# Patient Record
Sex: FEMALE | Race: WHITE | NOT HISPANIC OR LATINO | Employment: OTHER | URBAN - NONMETROPOLITAN AREA
[De-identification: names, ages, dates, MRNs, and addresses within clinical notes are randomized per-mention and may not be internally consistent; named-entity substitution may affect disease eponyms.]

---

## 2021-04-28 ENCOUNTER — DOCUMENTATION (OUTPATIENT)
Dept: ONCOLOGY | Facility: CLINIC | Age: 51
End: 2021-04-28

## 2021-04-28 ENCOUNTER — CONSULT (OUTPATIENT)
Dept: ONCOLOGY | Facility: CLINIC | Age: 51
End: 2021-04-28

## 2021-04-28 VITALS
HEIGHT: 62 IN | TEMPERATURE: 97.5 F | BODY MASS INDEX: 53.92 KG/M2 | WEIGHT: 293 LBS | DIASTOLIC BLOOD PRESSURE: 84 MMHG | HEART RATE: 101 BPM | OXYGEN SATURATION: 93 % | RESPIRATION RATE: 18 BRPM | SYSTOLIC BLOOD PRESSURE: 136 MMHG

## 2021-04-28 DIAGNOSIS — D75.1 POLYCYTHEMIA: ICD-10-CM

## 2021-04-28 DIAGNOSIS — I82.622: Primary | ICD-10-CM

## 2021-04-28 DIAGNOSIS — G54.0 THORACIC OUTLET SYNDROME: ICD-10-CM

## 2021-04-28 LAB
ALBUMIN SERPL-MCNC: 3.73 G/DL (ref 3.5–5.2)
ALBUMIN/GLOB SERPL: 1 G/DL
ALP SERPL-CCNC: 81 U/L (ref 39–117)
ALT SERPL W P-5'-P-CCNC: 29 U/L (ref 1–33)
ANION GAP SERPL CALCULATED.3IONS-SCNC: 11.1 MMOL/L (ref 5–15)
AST SERPL-CCNC: 14 U/L (ref 1–32)
BASOPHILS # BLD AUTO: 0.07 10*3/MM3 (ref 0–0.2)
BASOPHILS NFR BLD AUTO: 0.6 % (ref 0–1.5)
BILIRUB SERPL-MCNC: 0.3 MG/DL (ref 0–1.2)
BUN SERPL-MCNC: 14 MG/DL (ref 6–20)
BUN/CREAT SERPL: 17.1 (ref 7–25)
CALCIUM SPEC-SCNC: 9.1 MG/DL (ref 8.6–10.5)
CHLORIDE SERPL-SCNC: 105 MMOL/L (ref 98–107)
CO2 SERPL-SCNC: 22.9 MMOL/L (ref 22–29)
CREAT SERPL-MCNC: 0.82 MG/DL (ref 0.57–1)
DEPRECATED RDW RBC AUTO: 48.9 FL (ref 37–54)
EOSINOPHIL # BLD AUTO: 0.17 10*3/MM3 (ref 0–0.4)
EOSINOPHIL NFR BLD AUTO: 1.4 % (ref 0.3–6.2)
ERYTHROCYTE [DISTWIDTH] IN BLOOD BY AUTOMATED COUNT: 16.1 % (ref 12.3–15.4)
FERRITIN SERPL-MCNC: 41.64 NG/ML (ref 13–150)
GFR SERPL CREATININE-BSD FRML MDRD: 73 ML/MIN/1.73
GLOBULIN UR ELPH-MCNC: 3.8 GM/DL
GLUCOSE SERPL-MCNC: 140 MG/DL (ref 65–99)
HCT VFR BLD AUTO: 51 % (ref 34–46.6)
HGB BLD-MCNC: 15.5 G/DL (ref 12–15.9)
IMM GRANULOCYTES # BLD AUTO: 0.03 10*3/MM3 (ref 0–0.05)
IMM GRANULOCYTES NFR BLD AUTO: 0.3 % (ref 0–0.5)
IRON 24H UR-MRATE: 35 MCG/DL (ref 37–145)
IRON SATN MFR SERPL: 8 % (ref 20–50)
LYMPHOCYTES # BLD AUTO: 1.89 10*3/MM3 (ref 0.7–3.1)
LYMPHOCYTES NFR BLD AUTO: 15.8 % (ref 19.6–45.3)
MCH RBC QN AUTO: 25.7 PG (ref 26.6–33)
MCHC RBC AUTO-ENTMCNC: 30.4 G/DL (ref 31.5–35.7)
MCV RBC AUTO: 84.4 FL (ref 79–97)
MONOCYTES # BLD AUTO: 0.63 10*3/MM3 (ref 0.1–0.9)
MONOCYTES NFR BLD AUTO: 5.3 % (ref 5–12)
NEUTROPHILS NFR BLD AUTO: 76.6 % (ref 42.7–76)
NEUTROPHILS NFR BLD AUTO: 9.19 10*3/MM3 (ref 1.7–7)
NRBC BLD AUTO-RTO: 0 /100 WBC (ref 0–0.2)
PLATELET # BLD AUTO: 350 10*3/MM3 (ref 140–450)
PMV BLD AUTO: 11.5 FL (ref 6–12)
POTASSIUM SERPL-SCNC: 4.6 MMOL/L (ref 3.5–5.2)
PROT SERPL-MCNC: 7.5 G/DL (ref 6–8.5)
RBC # BLD AUTO: 6.04 10*6/MM3 (ref 3.77–5.28)
SODIUM SERPL-SCNC: 139 MMOL/L (ref 136–145)
TIBC SERPL-MCNC: 434 MCG/DL (ref 298–536)
TRANSFERRIN SERPL-MCNC: 291 MG/DL (ref 200–360)
WBC # BLD AUTO: 11.98 10*3/MM3 (ref 3.4–10.8)

## 2021-04-28 PROCEDURE — 80053 COMPREHEN METABOLIC PANEL: CPT | Performed by: INTERNAL MEDICINE

## 2021-04-28 PROCEDURE — 83090 ASSAY OF HOMOCYSTEINE: CPT | Performed by: INTERNAL MEDICINE

## 2021-04-28 PROCEDURE — 85025 COMPLETE CBC W/AUTO DIFF WBC: CPT | Performed by: INTERNAL MEDICINE

## 2021-04-28 PROCEDURE — 81241 F5 GENE: CPT | Performed by: INTERNAL MEDICINE

## 2021-04-28 PROCEDURE — 82728 ASSAY OF FERRITIN: CPT | Performed by: INTERNAL MEDICINE

## 2021-04-28 PROCEDURE — 36415 COLL VENOUS BLD VENIPUNCTURE: CPT | Performed by: INTERNAL MEDICINE

## 2021-04-28 PROCEDURE — 81240 F2 GENE: CPT | Performed by: INTERNAL MEDICINE

## 2021-04-28 PROCEDURE — 86147 CARDIOLIPIN ANTIBODY EA IG: CPT | Performed by: INTERNAL MEDICINE

## 2021-04-28 PROCEDURE — 86146 BETA-2 GLYCOPROTEIN ANTIBODY: CPT | Performed by: INTERNAL MEDICINE

## 2021-04-28 PROCEDURE — 81291 MTHFR GENE: CPT | Performed by: INTERNAL MEDICINE

## 2021-04-28 PROCEDURE — 83540 ASSAY OF IRON: CPT | Performed by: INTERNAL MEDICINE

## 2021-04-28 PROCEDURE — 84466 ASSAY OF TRANSFERRIN: CPT | Performed by: INTERNAL MEDICINE

## 2021-04-28 PROCEDURE — 99204 OFFICE O/P NEW MOD 45 MIN: CPT | Performed by: INTERNAL MEDICINE

## 2021-04-28 RX ORDER — MECLIZINE HCL 25MG 25 MG/1
25 TABLET, CHEWABLE ORAL 3 TIMES DAILY PRN
COMMUNITY

## 2021-04-28 RX ORDER — CEFDINIR 300 MG/1
300 CAPSULE ORAL NIGHTLY
COMMUNITY

## 2021-04-28 RX ORDER — METOPROLOL SUCCINATE 25 MG/1
25 TABLET, EXTENDED RELEASE ORAL DAILY
COMMUNITY

## 2021-04-28 RX ORDER — EMPAGLIFLOZIN 25 MG/1
25 TABLET, FILM COATED ORAL EVERY MORNING
COMMUNITY

## 2021-04-28 RX ORDER — OMEPRAZOLE 40 MG/1
40 CAPSULE, DELAYED RELEASE ORAL DAILY
COMMUNITY

## 2021-04-28 RX ORDER — LEVOTHYROXINE SODIUM 0.12 MG/1
125 TABLET ORAL DAILY
COMMUNITY

## 2021-04-28 NOTE — PROGRESS NOTES
Rosie Villegas  [id number]  1970 4/28/2021      Referring Provider:   Mahin Butt MD    Reason for Consultation:   Left upper extremity basalic deep venous thrombosis (DVT)    Chief Complaint:  Deep venous thrombosis      History of Present Illness   Rosie Villegas is a very pleasant 50 y.o.  female who presents in new consultation at the request of Dr. Mahin Butt for further management and evaluation of left upper extremity basalic deep venous thrombosis (DVT).    She reports that several weeks ago she moved a heavy couch and thereafter began experiencing left arm pain along with bluish discoloration in her fingertips which concerned to seek further evaluation from her primary physician who obtained a upper extremity duplex and was found to have a basilic vein thrombosis. She was started on Eliquis which she is tolerating well and her symptoms have improved. She denies of any personal or family history significant for thrombosis or miscarriages. No recent oral contraceptive or hormone replacement treatment. She has had no lines or devices in that arm or chest. She does reports that several years ago she was painting ceilings and thereafter developed neck pain and since that time experiences intermittent left arm cramping pain, numbness, and weakness with excessive use. Previous to starting Eliquis she was on a daily ASA which she discontinued once she started anticoagulation.         The following portions of the patient's history were reviewed and updated as appropriate: allergies, current medications, past family history, past medical history, past social history, past surgical history and problem list.      No Known Allergies      Past Medical History:   Diagnosis Date   • Diabetes mellitus (CMS/HCC)    • Disease of thyroid gland    • Hypertension    GERD      Past Surgical History:   Procedure Laterality Date   • CHOLECYSTECTOMY     • TUBAL ABDOMINAL LIGATION           Social History        Socioeconomic History   • Marital status:      Spouse name: Not on file   • Number of children: Not on file   • Years of education: Not on file   • Highest education level: Not on file   Tobacco Use   • Smoking status: Never Smoker   • Smokeless tobacco: Never Used   Vaping Use   • Vaping Use: Never used   Substance and Sexual Activity   • Alcohol use: Never   • Drug use: Never   • Sexual activity: Defer   Has two children who are healthy. Previous smoker has stopped for now seven to ten years. No alcohol or illicit drug use. Was previously working as a CNA.       Family History   Problem Relation Age of Onset   • Cancer Prostate  Father Diagnosed in his 60s.              Current Outpatient Medications:   •  apixaban (ELIQUIS) 5 MG tablet tablet, Take 5 mg by mouth 2 (Two) Times a Day., Disp: , Rfl:   •  cefdinir (OMNICEF) 300 MG capsule, Take 300 mg by mouth Every Night., Disp: , Rfl:   •  Empagliflozin (Jardiance) 25 MG tablet, Take 25 mg by mouth Every Morning., Disp: , Rfl:   •  Insulin Glargine (TOUJEO MAX SOLOSTAR SC), Inject 40 mg under the skin into the appropriate area as directed., Disp: , Rfl:   •  levothyroxine (SYNTHROID, LEVOTHROID) 125 MCG tablet, Take 125 mcg by mouth Daily., Disp: , Rfl:   •  meclizine 25 MG chewable tablet chewable tablet, Chew 25 mg 3 (Three) Times a Day As Needed., Disp: , Rfl:   •  metoprolol succinate XL (TOPROL-XL) 25 MG 24 hr tablet, Take 25 mg by mouth Daily., Disp: , Rfl:   •  omeprazole (priLOSEC) 40 MG capsule, Take 40 mg by mouth Daily., Disp: , Rfl:   •  SITagliptin-metFORMIN HCl (JANUMET XR PO), Take 100 mg by mouth., Disp: , Rfl:         Review of Systems  Constitutional: No fever, chills, night sweats or weight loss.   HEENT:  No headaches, vision changes or hearing changes, no sinus drainage, sore throat.   Cardiovascular:  No palpitations, chest pain, syncopal episodes or edema.   Pulmonary:  No shortness of breath, hemoptysis, or cough.    Gastrointestinal:  No nausea or vomiting. No constipation or diarrhea. No abdominal pain. No melena or hematochezia.   Genitourinary:  No hematuria, or changes in urination.   Musculoskeletal:  No pain, or joint problems.   Skin: No current rashes or pruritus.   Endocrine:  No hot flashes or chills   Hematologic:  No history of free bleeding, spontaneous bleeding or clotting problems. No lymphadenopathy.    Immunologic:  No allergies or frequent infections.   Neurologic: No numbness, tingling, or weakness.   Psychiatric:  No anxiety or depression.           Physical Exam  Vital Signs: These were reviewed and listed as per patient’s electronic medical chart  Vitals:    04/28/21 1103   BP: 136/84   Pulse: 101   Resp: 18   Temp: 97.5 °F (36.4 °C)   SpO2: 93%     General: Awake, alert and oriented, in no distress  HEENT: Head is atraumatic, normocephalic, extraocular movements full, no scleral icterus  Neck: supple, no jvd, lymphadenopathy or masses  Cardiovascular: regular rate and rhythm without murmurs, rubs or gallops  Pulmonary: non-labored, clear to auscultation bilaterally, no wheezing  Abdomen: soft, non-tender, non-distended, normal active bowel sounds present, no organomegaly  Extremities: No clubbing, cyanosis or edema  Lymph: No cervical, supraclavicular adenopathy  Neurologic: Mental status as above, alert, awake and oriented, grossly non-focal exam  Skin: warm, dry, intact          Pain Score:  Pain Score    04/28/21 1103   PainSc: 0-No pain       PHQ-Score Total:  PHQ-9 Total Score: 0    IMAGING:             Labs / Studies:    Consult on 04/28/2021   Component Date Value   • Glucose 04/28/2021 140*   • BUN 04/28/2021 14    • Creatinine 04/28/2021 0.82    • Sodium 04/28/2021 139    • Potassium 04/28/2021 4.6    • Chloride 04/28/2021 105    • CO2 04/28/2021 22.9    • Calcium 04/28/2021 9.1    • Total Protein 04/28/2021 7.5    • Albumin 04/28/2021 3.73    • ALT (SGPT) 04/28/2021 29    • AST (SGOT)  04/28/2021 14    • Alkaline Phosphatase 04/28/2021 81    • Total Bilirubin 04/28/2021 0.3    • eGFR Non African Amer 04/28/2021 73    • Globulin 04/28/2021 3.8    • A/G Ratio 04/28/2021 1.0    • BUN/Creatinine Ratio 04/28/2021 17.1    • Anion Gap 04/28/2021 11.1    • WBC 04/28/2021 11.98*   • RBC 04/28/2021 6.04*   • Hemoglobin 04/28/2021 15.5    • Hematocrit 04/28/2021 51.0*   • MCV 04/28/2021 84.4    • MCH 04/28/2021 25.7*   • MCHC 04/28/2021 30.4*   • RDW 04/28/2021 16.1*   • RDW-SD 04/28/2021 48.9    • MPV 04/28/2021 11.5    • Platelets 04/28/2021 350    • Neutrophil % 04/28/2021 76.6*   • Lymphocyte % 04/28/2021 15.8*   • Monocyte % 04/28/2021 5.3    • Eosinophil % 04/28/2021 1.4    • Basophil % 04/28/2021 0.6    • Immature Grans % 04/28/2021 0.3    • Neutrophils, Absolute 04/28/2021 9.19*   • Lymphocytes, Absolute 04/28/2021 1.89    • Monocytes, Absolute 04/28/2021 0.63    • Eosinophils, Absolute 04/28/2021 0.17    • Basophils, Absolute 04/28/2021 0.07    • Immature Grans, Absolute 04/28/2021 0.03    • nRBC 04/28/2021 0.0           Assessment/Plan   Rosie Villegas is a very pleasant 50 y.o.  female who presents in new consultation at the request of Dr. Mahin Butt for further management and evaluation of left upper extremity basalic deep venous thrombosis (DVT).    Left upper extremity basalic deep venous thrombosis (DVT)  - Would recommend a minimum of three months of anticoagulation based on guidelines from the [employer].  - Will obtain a hypercoagulable workup given that she has an upper extremity thrombosis without risk factors. Given her symptoms prior to her diagnosis it is possible that she could also have thoracic outlet syndrome causing her to be at risk for thrombosis and therefore will obtain a CT scan to further evaluate.  - She is currently on Eliquis and tolerating well.  - She was also advised to follow with her primary for routine cancer screening exams as  malignancy can also cause thrombosis. She has not yet had a screening mammogram, colonoscopy, or PAP smear. She will further discuss this with her primary provider.     Polycythemia  - She has no known history significant for an erythrocytosis. Will have her return for labs to repeat CBC and obtain an erythropoietin levels, BCR ABL PCR and JAK2 V617F studies. Iron panel and ferritin levels are pending.     ACO / MACK/Other  Quality measures  -  Rosie Villegas  reports that she has never smoked. She has never used smokeless tobacco. She is a previous smoker and quit ten years ago.  -  Rosie Villegas did not receive 2020 flu vaccine. Declines.   -  Rosie Villegas reports a pain score of 0. Given her pain assessment as noted, treatment options were discussed and the following options were decided upon as a follow-up plan to address the patient's pain: continuation of current treatment plan for pain.  -  Current outpatient and discharge medications have been reconciled for the patient.  Reviewed by: Annalise Galvez MD      I will have the patient return in follow up appointment to review test results in one month with labs and imaging prior. She understands that should she have any questions or concerns prior to her appointment she should give us a call at any time and I would be happy to see her sooner. It was a pleasure to see this patient in clinic today, thank you for allowing me to participate in the care of this patient.      Annalise Galvez MD  04/28/2021  14:36 EDT

## 2021-04-28 NOTE — PROGRESS NOTES
Patient comleted her PHQ depression screening.    PHQ-9 Total Score:  0    Patient seen in office visit this date.      SS will follow as needed.

## 2021-04-29 LAB
CARDIOLIPIN IGG SER IA-ACNC: <9 GPL U/ML (ref 0–14)
CARDIOLIPIN IGM SER IA-ACNC: <9 MPL U/ML (ref 0–12)
HCYS SERPL-MCNC: 9.4 UMOL/L (ref 0–15)

## 2021-04-30 LAB
B2 GLYCOPROT1 IGA SER-ACNC: <9 GPI IGA UNITS (ref 0–25)
B2 GLYCOPROT1 IGG SER-ACNC: <9 GPI IGG UNITS (ref 0–20)
B2 GLYCOPROT1 IGM SER-ACNC: <9 GPI IGM UNITS (ref 0–32)
F5 GENE MUT ANL BLD/T: ABNORMAL
FACTOR II, DNA ANALYSIS: NORMAL

## 2021-05-03 ENCOUNTER — TELEPHONE (OUTPATIENT)
Dept: ONCOLOGY | Facility: CLINIC | Age: 51
End: 2021-05-03

## 2021-05-03 NOTE — TELEPHONE ENCOUNTER
Caller: JACINTO CHRISTY    Relationship to patient: DAUGHTER    Best call back number: [phone]    STATES DR JONES IS ORDERING A CT OF THE CHEST FOR THE PT. PT'S DAUGHTER IS CALLING TO SEE IF THEY CAN HAVE THE CT DONE IN [facility]. PLEASE CALL BACK WHEN POSSIBLE

## 2021-05-06 LAB — MTHFR GENE MUT ANL BLD/T: NORMAL

## 2021-05-18 ENCOUNTER — OFFICE VISIT (OUTPATIENT)
Dept: ONCOLOGY | Facility: CLINIC | Age: 51
End: 2021-05-18

## 2021-05-18 DIAGNOSIS — D75.1 POLYCYTHEMIA: ICD-10-CM

## 2021-05-18 DIAGNOSIS — G54.0 THORACIC OUTLET SYNDROME: ICD-10-CM

## 2021-05-18 DIAGNOSIS — I82.622: Primary | ICD-10-CM

## 2021-05-18 PROCEDURE — 99442 PR PHYS/QHP TELEPHONE EVALUATION 11-20 MIN: CPT | Performed by: INTERNAL MEDICINE

## 2021-05-18 NOTE — PROGRESS NOTES
Rosie Villegas  [id number]  1970 5/18/2021      Referring Provider:   Mahin Butt MD    Reason for Follow Up:   Left upper extremity basalic deep venous thrombosis (DVT)  Heterozygous Factor V Leiden mutation    Chief Complaint:  Deep venous thrombosis      History of Present Illness   Rosie Villegas is a very pleasant 50 y.o.  female who presents in follow up appointment for further management and evaluation of left upper extremity basalic deep venous thrombosis (DVT).    She reports that several weeks ago she moved a heavy couch and thereafter began experiencing left arm pain along with bluish discoloration in her fingertips which concerned to seek further evaluation from her primary physician who obtained a upper extremity duplex and was found to have a basilic vein thrombosis. She was started on Eliquis which she is tolerating well and her symptoms have improved. She denies of any personal or family history significant for thrombosis or miscarriages. No recent oral contraceptive or hormone replacement treatment. She has had no lines or devices in that arm or chest. She does reports that several years ago she was painting ceilings and thereafter developed neck pain and since that time experiences intermittent left arm cramping pain, numbness, and weakness with excessive use. Previous to starting Eliquis she was on a daily ASA which she discontinued once she started anticoagulation.     Interim History:  Ms. Villegas consents to a telephone visit today understanding the risks and limitations of a telephone visit.    She denies of any significant changes since her last visit and is tolerating anticoagulation. She reports she no longer experiences arm weakness or tingling which has now resolved with anticoagulation.       The following portions of the patient's history were reviewed and updated as appropriate: allergies, current medications, past family history, past medical history, past social  history, past surgical history and problem list.      No Known Allergies      Past Medical History:   Diagnosis Date   • Diabetes mellitus (CMS/HCC)    • Disease of thyroid gland    • Hypertension    GERD      Past Surgical History:   Procedure Laterality Date   • CHOLECYSTECTOMY     • TUBAL ABDOMINAL LIGATION           Social History     Socioeconomic History   • Marital status:      Spouse name: Not on file   • Number of children: Not on file   • Years of education: Not on file   • Highest education level: Not on file   Tobacco Use   • Smoking status: Never Smoker   • Smokeless tobacco: Never Used   Vaping Use   • Vaping Use: Never used   Substance and Sexual Activity   • Alcohol use: Never   • Drug use: Never   • Sexual activity: Defer   Has two children who are healthy. Previous smoker has stopped for now seven to ten years. No alcohol or illicit drug use. Was previously working as a CNA.       Family History   Problem Relation Age of Onset   • Cancer Prostate  Father Diagnosed in his 60s.              Current Outpatient Medications:   •  apixaban (ELIQUIS) 5 MG tablet tablet, Take 5 mg by mouth 2 (Two) Times a Day., Disp: , Rfl:   •  cefdinir (OMNICEF) 300 MG capsule, Take 300 mg by mouth Every Night., Disp: , Rfl:   •  Empagliflozin (Jardiance) 25 MG tablet, Take 25 mg by mouth Every Morning., Disp: , Rfl:   •  Insulin Glargine (TOUJEO MAX SOLOSTAR SC), Inject 40 mg under the skin into the appropriate area as directed., Disp: , Rfl:   •  levothyroxine (SYNTHROID, LEVOTHROID) 125 MCG tablet, Take 125 mcg by mouth Daily., Disp: , Rfl:   •  meclizine 25 MG chewable tablet chewable tablet, Chew 25 mg 3 (Three) Times a Day As Needed., Disp: , Rfl:   •  metoprolol succinate XL (TOPROL-XL) 25 MG 24 hr tablet, Take 25 mg by mouth Daily., Disp: , Rfl:   •  omeprazole (priLOSEC) 40 MG capsule, Take 40 mg by mouth Daily., Disp: , Rfl:   •  SITagliptin-metFORMIN HCl (JANUMET XR PO), Take 100 mg by mouth., Disp: ,  Rfl:         Review of Systems  Pertinent positives are listed as per history of present of illness, all other systems reviewed and are negative.          Physical Exam  Vital Signs: Unable to obtain as patient was not in office  There were no vitals filed for this visit.  General: Awake, alert and oriented, does not appear to sound distressed over the phone  HEENT: Normal hearing on the telephone  Pulmonary: no auditory wheezing  Neurologic: Mental status as above, alert, awake and oriented, memory appears to be intact  Psych: normal mood and affect          Pain Score:  There were no vitals filed for this visit.    PHQ-Score Total:  PHQ-9 Total Score:      IMAGING:                         Labs / Studies:    Consult on 04/28/2021   Component Date Value   • Glucose 04/28/2021 140*   • BUN 04/28/2021 14    • Creatinine 04/28/2021 0.82    • Sodium 04/28/2021 139    • Potassium 04/28/2021 4.6    • Chloride 04/28/2021 105    • CO2 04/28/2021 22.9    • Calcium 04/28/2021 9.1    • Total Protein 04/28/2021 7.5    • Albumin 04/28/2021 3.73    • ALT (SGPT) 04/28/2021 29    • AST (SGOT) 04/28/2021 14    • Alkaline Phosphatase 04/28/2021 81    • Total Bilirubin 04/28/2021 0.3    • eGFR Non African Amer 04/28/2021 73    • Globulin 04/28/2021 3.8    • A/G Ratio 04/28/2021 1.0    • BUN/Creatinine Ratio 04/28/2021 17.1    • Anion Gap 04/28/2021 11.1    • Factor V Leiden 04/28/2021 Heterozygous*   • Factor II, DNA Analysis 04/28/2021 Normal    • Homocysteine, Plasma (Qu* 04/28/2021 9.4    • MTHFR 04/28/2021 Comment    • Beta-2 Glyco 1 IgG 04/28/2021 <9    • Beta-2 Glyco 1 IgA 04/28/2021 <9    • Beta-2 Glyco 1 IgM 04/28/2021 <9    • Anticardiolipin IgG 04/28/2021 <9    • Anticardiolipin IgM 04/28/2021 <9    • WBC 04/28/2021 11.98*   • RBC 04/28/2021 6.04*   • Hemoglobin 04/28/2021 15.5    • Hematocrit 04/28/2021 51.0*   • MCV 04/28/2021 84.4    • MCH 04/28/2021 25.7*   • MCHC 04/28/2021 30.4*   • RDW 04/28/2021 16.1*   • RDW-SD  04/28/2021 48.9    • MPV 04/28/2021 11.5    • Platelets 04/28/2021 350    • Neutrophil % 04/28/2021 76.6*   • Lymphocyte % 04/28/2021 15.8*   • Monocyte % 04/28/2021 5.3    • Eosinophil % 04/28/2021 1.4    • Basophil % 04/28/2021 0.6    • Immature Grans % 04/28/2021 0.3    • Neutrophils, Absolute 04/28/2021 9.19*   • Lymphocytes, Absolute 04/28/2021 1.89    • Monocytes, Absolute 04/28/2021 0.63    • Eosinophils, Absolute 04/28/2021 0.17    • Basophils, Absolute 04/28/2021 0.07    • Immature Grans, Absolute 04/28/2021 0.03    • nRBC 04/28/2021 0.0    • Iron 04/28/2021 35*   • Iron Saturation 04/28/2021 8*   • Transferrin 04/28/2021 291    • TIBC 04/28/2021 434    • Ferritin 04/28/2021 41.64                  Assessment/Plan   Rosie Villegas is a very pleasant 50 y.o.  female who presents in follow up appointment for further management and evaluation of left upper extremity basalic deep venous thrombosis (DVT).    Left upper extremity basalic deep venous thrombosis (DVT)  Heterozygous Factor V Leiden (FVL) mutation  - Would recommend a minimum of three months of anticoagulation based on guidelines from the American College [employer] Chest [employer].  - Given her symptoms prior to her diagnosis it is possible that she could also have thoracic outlet syndrome causing her to be at risk for thrombosis and therefore I did also obtain a CT scan to further evaluate which was negative and she was advised to further discuss this with her primary provider.   - I did also obtain a hypercoagulable workup: She is heterozygous for Factor V Leiden mutation. No mutations seen in the prothrombin gene mutation. Coagulation markers, antithrombin panel, protein C & S functional and antigen assays are normal. No lupus anticoagulant seen on labs from PCP. Beta 2 glycoprotein and anticardiolipin antibodies are normal. 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 The MTHFR C677T variant was not identified. This combination of  results is not associated with an increased risk of hyperhomocysteinemia, venous thrombosis, coronary artery disease, or recurrent pregnancy loss. Homocysteine level is also normal.  - I spent the good part of our visit discussing FVL heterozygosity in venous thromboembolism (VTE). Only a small percentage of individuals with FVL will develop VTE in their lifetime. Despite the increase in risk of VTE, there is no evidence that heterozygosity for FVL increases overall mortality. Thus it is generally accepted that heterozygosity for FVL does not confer an increased risk for recurrent VTE among patients with a first unprovoked VTE, and the presence of FVL generally does not alter decision-making regarding the duration of anticoagulation. The initial treatment of venous thromboembolism (VTE) in individuals with FVL is the same as that of the general population. I did also recommend that she avoid estrogen-containing medications. I also counseled her on conditions that may increase VTE risk such as tobacco use, pregnancy, post partum, acute illness, long periods of immobility (ie: long car or plane rides). Individuals with FVL heterozygous mutation may also benefit from prophylactic anticoagulation in certain settings such as pregnancy, surgery, acute medical illness or hospitalizations which was discussed with her. She understands she must mobilize during long periods of immobilization. She understands that this mutation is hereditary and should discuss this with her children.  - She reports that prior to her thrombosis symptoms she moved a heavy couch and thereafter began experiencing left arm pain and weakness. Therefore her thrombosis was likely provoked. She is currently on Eliquis and tolerating well and will continue for at least three to four months.   - She was also advised to follow with her primary for routine cancer screening exams as malignancy can also cause thrombosis. She has not yet had a screening  mammogram, colonoscopy, or PAP smear. She will further discuss this with her primary provider.     Polycythemia  - She has no known history significant for an erythrocytosis. Most recent lab work obtained from her PCP did not show repeat CBC. Will repeat in three months prior to taking her off of anticoagulation. If continues to remain elevated will obtain erythropoietin level, BCR ABL PCR, and JAK2 V617F mutation to further evaluate.   - Could also be secondary as she was a previous smoker (1 ppd for twenty years, quit ten years ago) and recent CT chest significant for emphysema.     ACO / MACK/Other  Quality measures  -  Rosie Villegas reports that she was a previous smoker. She has never used smokeless tobacco. She is a previous smoker and quit ten years ago, 1 ppd for twenty years.  -  Rosie Villegas did not receive 2020 flu vaccine. Declines.   -  Rosie Villegas does not report any pain. Given her pain assessment as noted, treatment options were discussed and the following options were decided upon as a follow-up plan to address the patient's pain: continuation of current treatment plan for pain.  -  Current outpatient and discharge medications have been reconciled for the patient.  Reviewed by: Annalise Galvez MD      I will have the patient return in follow up appointment in three months. She understands that should she have any questions or concerns prior to her appointment she should give us a call at any time and I would be happy to see her sooner. It was a pleasure to see this patient in clinic today, thank you for allowing me to participate in the care of this patient.    Approximately fourteen minutes spent on the phone with the patient discussing the above issues.     Annalise Galvez MD  05/18/2021  14:43 EDT